# Patient Record
Sex: MALE | Race: WHITE
[De-identification: names, ages, dates, MRNs, and addresses within clinical notes are randomized per-mention and may not be internally consistent; named-entity substitution may affect disease eponyms.]

---

## 2021-04-09 ENCOUNTER — HOSPITAL ENCOUNTER (OUTPATIENT)
Dept: HOSPITAL 79 - KOH-I | Age: 61
End: 2021-04-09
Attending: CLINIC/CENTER
Payer: MEDICARE

## 2021-04-09 DIAGNOSIS — M19.071: ICD-10-CM

## 2021-04-09 DIAGNOSIS — M25.571: Primary | ICD-10-CM

## 2021-04-15 ENCOUNTER — APPOINTMENT (OUTPATIENT)
Dept: VACCINE CLINIC | Facility: HOSPITAL | Age: 61
End: 2021-04-15

## 2021-05-06 ENCOUNTER — IMMUNIZATION (OUTPATIENT)
Dept: VACCINE CLINIC | Facility: HOSPITAL | Age: 61
End: 2021-05-06

## 2021-05-06 PROCEDURE — 0001A: CPT | Performed by: INTERNAL MEDICINE

## 2021-05-06 PROCEDURE — 91300 HC SARSCOV02 VAC 30MCG/0.3ML IM: CPT | Performed by: INTERNAL MEDICINE

## 2021-05-28 ENCOUNTER — IMMUNIZATION (OUTPATIENT)
Dept: VACCINE CLINIC | Facility: HOSPITAL | Age: 61
End: 2021-05-28

## 2021-05-28 PROCEDURE — 0002A: CPT | Performed by: INTERNAL MEDICINE

## 2021-05-28 PROCEDURE — 91300 HC SARSCOV02 VAC 30MCG/0.3ML IM: CPT | Performed by: INTERNAL MEDICINE

## 2022-01-05 ENCOUNTER — HOSPITAL ENCOUNTER (OUTPATIENT)
Dept: HOSPITAL 79 - KOH-I | Age: 62
End: 2022-01-05
Attending: NURSE PRACTITIONER
Payer: MEDICARE

## 2022-01-05 DIAGNOSIS — M62.830: ICD-10-CM

## 2022-01-05 DIAGNOSIS — R07.82: Primary | ICD-10-CM

## 2022-11-23 ENCOUNTER — OFFICE VISIT (OUTPATIENT)
Dept: UROLOGY | Facility: CLINIC | Age: 62
End: 2022-11-23

## 2022-11-23 VITALS — WEIGHT: 230 LBS | HEIGHT: 69 IN | BODY MASS INDEX: 34.07 KG/M2

## 2022-11-23 DIAGNOSIS — R35.0 FREQUENCY OF MICTURITION: ICD-10-CM

## 2022-11-23 DIAGNOSIS — N41.0 ACUTE PROSTATITIS: Primary | ICD-10-CM

## 2022-11-23 LAB
BILIRUB BLD-MCNC: NEGATIVE MG/DL
CLARITY, POC: CLEAR
COLOR UR: ABNORMAL
EXPIRATION DATE: ABNORMAL
GLUCOSE UR STRIP-MCNC: NEGATIVE MG/DL
KETONES UR QL: NEGATIVE
LEUKOCYTE EST, POC: NEGATIVE
Lab: ABNORMAL
NITRITE UR-MCNC: NEGATIVE MG/ML
PH UR: 6 [PH] (ref 5–8)
PROT UR STRIP-MCNC: ABNORMAL MG/DL
RBC # UR STRIP: NEGATIVE /UL
SP GR UR: 1.02 (ref 1–1.03)
UROBILINOGEN UR QL: NORMAL

## 2022-11-23 PROCEDURE — 99203 OFFICE O/P NEW LOW 30 MIN: CPT

## 2022-11-23 PROCEDURE — 81003 URINALYSIS AUTO W/O SCOPE: CPT

## 2022-11-23 RX ORDER — SULFAMETHOXAZOLE AND TRIMETHOPRIM 800; 160 MG/1; MG/1
1 TABLET ORAL 2 TIMES DAILY
Qty: 28 TABLET | Refills: 0 | Status: SHIPPED | OUTPATIENT
Start: 2022-11-23 | End: 2022-12-09 | Stop reason: SDUPTHER

## 2022-11-23 NOTE — PROGRESS NOTES
"Chief Complaint:    Chief Complaint   Patient presents with   • Difficulty Urinating     Lower abdominal pain, hematuria. New pt       Vital Signs:   Ht 175.3 cm (69\")   Wt 104 kg (230 lb)   BMI 33.97 kg/m²   Body mass index is 33.97 kg/m².      HPI:  Bahman Hernandez is a 62 y.o. male who presents today for initial evaluation     History of Present Illness  Mr. Hernandez presents to the clinic for initial evaluation of hematuria and lower abdominal pain.  Patient reports that he was diagnosed with acute prostatitis around the end of October.  He was started on a 10-day course of ciprofloxacin 500 mg once daily.  Reports his symptoms have improved after finishing medication.  States that he still experiences some hematuria, dysuria perineum pain, and blood in semen.  Denies any fevers, chills, difficulty urinating, frequency, urgency, or scrotal pain.  PSA taken on October 31, 2022 came back slightly elevated at 4.1.  I suspect this is secondary to prostatitis.  I would like to continue the patient on a 2-week course of Bactrim at this time and have him follow-up with me for reevaluation of symptoms.      Past Medical History:  History reviewed. No pertinent past medical history.    Current Meds:  Current Outpatient Medications   Medication Sig Dispense Refill   • sulfamethoxazole-trimethoprim (Bactrim DS) 800-160 MG per tablet Take 1 tablet by mouth 2 (Two) Times a Day. 28 tablet 0     No current facility-administered medications for this visit.        Allergies:   Allergies   Allergen Reactions   • Hydrocortisone Anaphylaxis   • Lisinopril Other (See Comments)     Severe joint pain        Past Surgical History:  History reviewed. No pertinent surgical history.    Social History:  Social History     Socioeconomic History   • Marital status: Single       Family History:  History reviewed. No pertinent family history.    Review of Systems:  Review of Systems   Constitutional: Negative for fatigue, fever and unexpected " weight change.   Respiratory: Negative for chest tightness and shortness of breath.    Cardiovascular: Negative for chest pain.   Gastrointestinal: Negative for abdominal pain, constipation, diarrhea, nausea and vomiting.   Genitourinary: Positive for dysuria, flank pain and hematuria. Negative for difficulty urinating, frequency and urgency.   Skin: Negative for rash.   Psychiatric/Behavioral: Negative for confusion and suicidal ideas.       Physical Exam:  Physical Exam  Constitutional:       General: He is not in acute distress.     Appearance: Normal appearance.   HENT:      Head: Normocephalic and atraumatic.      Nose: Nose normal.      Mouth/Throat:      Mouth: Mucous membranes are moist.   Eyes:      Conjunctiva/sclera: Conjunctivae normal.   Cardiovascular:      Rate and Rhythm: Normal rate and regular rhythm.      Pulses: Normal pulses.      Heart sounds: Normal heart sounds.   Pulmonary:      Effort: Pulmonary effort is normal.      Breath sounds: Normal breath sounds.   Abdominal:      General: Bowel sounds are normal.      Palpations: Abdomen is soft.   Genitourinary:     Comments: He did not want a prostate exam at this time  Musculoskeletal:         General: Normal range of motion.      Cervical back: Normal range of motion.   Skin:     General: Skin is warm.   Neurological:      General: No focal deficit present.      Mental Status: He is alert and oriented to person, place, and time.   Psychiatric:         Mood and Affect: Mood normal.         Behavior: Behavior normal.         Thought Content: Thought content normal.         Judgment: Judgment normal.         Recent Image (CT and/or KUB):   CT Abdomen and Pelvis: No results found for this or any previous visit.     CT Stone Protocol: No results found for this or any previous visit.     KUB: No results found for this or any previous visit.       Labs:  Brief Urine Lab Results  (Last result in the past 365 days)      Color   Clarity   Blood   Leuk  Est   Nitrite   Protein   CREAT   Urine HCG        11/23/22 1125 Dark Yellow   Clear   Negative   Negative   Negative   Trace               Office Visit on 11/23/2022   Component Date Value Ref Range Status   • Color 11/23/2022 Dark Yellow  Yellow, Straw, Dark Yellow, Jenny Final   • Clarity, UA 11/23/2022 Clear  Clear Final   • Specific Gravity  11/23/2022 1.025  1.005 - 1.030 Final   • pH, Urine 11/23/2022 6.0  5.0 - 8.0 Final   • Leukocytes 11/23/2022 Negative  Negative Final   • Nitrite, UA 11/23/2022 Negative  Negative Final   • Protein, POC 11/23/2022 Trace (A)  Negative mg/dL Final   • Glucose, UA 11/23/2022 Negative  Negative mg/dL Final   • Ketones, UA 11/23/2022 Negative  Negative Final   • Urobilinogen, UA 11/23/2022 Normal  Normal, 0.2 E.U./dL Final   • Bilirubin 11/23/2022 Negative  Negative Final   • Blood, UA 11/23/2022 Negative  Negative Final   • Lot Number 11/23/2022 [id number]   Final   • Expiration Date 11/23/2022 2/8/24   Final        Procedure: None  Procedures     I have reviewed and agree with the above PMH, PSH, FMH, social history, medications, allergies, and labs.     Assessment/Plan:   Problem List Items Addressed This Visit        Genitourinary and Reproductive     Acute prostatitis - Primary    Relevant Medications    sulfamethoxazole-trimethoprim (Bactrim DS) 800-160 MG per tablet   Other Visit Diagnoses     Frequency of micturition              Health Maintenance:   Health Maintenance Due   Topic Date Due   • COLORECTAL CANCER SCREENING  Never done   • TDAP/TD VACCINES (1 - Tdap) Never done   • ZOSTER VACCINE (1 of 2) Never done   • COVID-19 Vaccine (3 - Booster for Pfizer series) 07/23/2021   • INFLUENZA VACCINE  Never done   • HEPATITIS C SCREENING  Never done   • ANNUAL WELLNESS VISIT  Never done        Smoking Counseling: Patient has never smoked or use smokeless tobacco    Urine Incontinence: Patient reports that he is not currently experiencing any symptoms of urinary  incontinence.    Patient was given instructions and counseling regarding his condition or for health maintenance advice. Please see specific information pulled into the AVS if appropriate.    Patient Education:   Acute prostatitis -discussed with the patient the pathophysiology of acute prostatitis.  Advised patient that it can take roughly 6 to 8 weeks for full treatment of prostatitis.  At this time I am recommending a 14-day course of Bactrim twice daily.  Patient has previously completed a 10-day course of ciprofloxacin 500 mg twice daily.  Advised patient to increase water intake to 2 to 3 L/day.  Advised patient to use warm soaks/compresses twice daily for 10 to 15 minutes.  Advised patient alternate ibuprofen and Tylenol every 4-6 hours as needed for pain/inflammation.  I will repeat the patient's PSA at his next visit.  He did not want a digital rectal exam at this time.  Advised patient return to clinic or the nearest ER if he experiences fever, chills, nausea, vomiting, gross blood but does not stop, or urinary retention.  Otherwise I will follow-up with the patient in 2 weeks for reevaluation.  Patient verbalizes understanding and agrees to plan of care.    Visit Diagnoses:    ICD-10-CM ICD-9-CM   1. Acute prostatitis  N41.0 601.0   2. Frequency of micturition  R35.0 788.41       Meds Ordered During Visit:  New Medications Ordered This Visit   Medications   • sulfamethoxazole-trimethoprim (Bactrim DS) 800-160 MG per tablet     Sig: Take 1 tablet by mouth 2 (Two) Times a Day.     Dispense:  28 tablet     Refill:  0       Follow Up Appointment: 2 weeks  No follow-ups on file.      This document has been electronically signed by Papo Garibay PA-C   November 23, 2022 11:55 EST    Part of this note may be an electronic transcription/translation of spoken language to printed text using the Dragon Dictation System.

## 2022-12-09 ENCOUNTER — OFFICE VISIT (OUTPATIENT)
Dept: UROLOGY | Facility: CLINIC | Age: 62
End: 2022-12-09

## 2022-12-09 VITALS — HEIGHT: 69 IN | WEIGHT: 230 LBS | BODY MASS INDEX: 34.07 KG/M2

## 2022-12-09 DIAGNOSIS — N41.0 ACUTE PROSTATITIS: ICD-10-CM

## 2022-12-09 PROCEDURE — 99213 OFFICE O/P EST LOW 20 MIN: CPT

## 2022-12-09 RX ORDER — SULFAMETHOXAZOLE AND TRIMETHOPRIM 800; 160 MG/1; MG/1
1 TABLET ORAL 2 TIMES DAILY
Qty: 28 TABLET | Refills: 0 | Status: SHIPPED | OUTPATIENT
Start: 2022-12-09 | End: 2023-03-27

## 2022-12-09 NOTE — PROGRESS NOTES
"Chief Complaint:    Chief Complaint   Patient presents with   • Acute prostatitis     2 wk follow up       Vital Signs:   Ht 175.3 cm (69.02\")   Wt 104 kg (230 lb)   BMI 33.95 kg/m²   Body mass index is 33.95 kg/m².      HPI:  Bahman Hernandez is a 62 y.o. male who presents today for follow up    History of Present Illness  Mr. Hernandez presents to the clinic for 2 week follow up for acute prostatitis. Patient had previously been on ciprofloxacin 500mg for 10 days by his PCP. Symptoms shortly returned after finishing the antibiotics and I started him on a 14 day course of bactrim twice daily. He reports that since taking the antibiotics he has felt remarkable better. He denies any more pain in the perineum or rectum. He reports some blood in his semen 2-3 days ago. Denies any dysuria, fever, chills, nausea, vomiting, difficulty urinating, frequency, or urgency. I will have the patient take another 14 day course of bactrim to make sure the infection is treated properly. I will have him follow up in 3 months for a repeat PSA.      Past Medical History:  History reviewed. No pertinent past medical history.    Current Meds:  Current Outpatient Medications   Medication Sig Dispense Refill   • sulfamethoxazole-trimethoprim (Bactrim DS) 800-160 MG per tablet Take 1 tablet by mouth 2 (Two) Times a Day. 28 tablet 0     No current facility-administered medications for this visit.        Allergies:   Allergies   Allergen Reactions   • Hydrocortisone Anaphylaxis   • Lisinopril Other (See Comments)     Severe joint pain        Past Surgical History:  History reviewed. No pertinent surgical history.    Social History:  Social History     Socioeconomic History   • Marital status: Single   Tobacco Use   • Smoking status: Never   • Smokeless tobacco: Never       Family History:  History reviewed. No pertinent family history.    Review of Systems:  Review of Systems   Constitutional: Negative for fatigue, fever and unexpected weight " change.   Respiratory: Negative for chest tightness and shortness of breath.    Cardiovascular: Negative for chest pain.   Gastrointestinal: Negative for abdominal pain, constipation, diarrhea, nausea and vomiting.   Genitourinary: Negative for difficulty urinating, dysuria, flank pain, frequency, hematuria, penile discharge, penile pain, penile swelling, scrotal swelling, testicular pain and urgency.   Skin: Negative for rash.   Psychiatric/Behavioral: Negative for confusion and suicidal ideas.       Physical Exam:  Physical Exam  Constitutional:       General: He is not in acute distress.     Appearance: Normal appearance.   HENT:      Head: Normocephalic and atraumatic.      Nose: Nose normal.      Mouth/Throat:      Mouth: Mucous membranes are moist.   Eyes:      Conjunctiva/sclera: Conjunctivae normal.   Cardiovascular:      Rate and Rhythm: Normal rate and regular rhythm.      Pulses: Normal pulses.      Heart sounds: Normal heart sounds.   Pulmonary:      Effort: Pulmonary effort is normal.      Breath sounds: Normal breath sounds.   Abdominal:      General: Bowel sounds are normal.      Palpations: Abdomen is soft.      Tenderness: There is no right CVA tenderness or left CVA tenderness.   Genitourinary:     Comments: He did not want a prostate exam  Musculoskeletal:         General: Normal range of motion.      Cervical back: Normal range of motion.   Skin:     General: Skin is warm.   Neurological:      General: No focal deficit present.      Mental Status: He is alert and oriented to person, place, and time.   Psychiatric:         Mood and Affect: Mood normal.         Behavior: Behavior normal.         Thought Content: Thought content normal.         Judgment: Judgment normal.         Recent Image (CT and/or KUB):   CT Abdomen and Pelvis: No results found for this or any previous visit.     CT Stone Protocol: No results found for this or any previous visit.     KUB: No results found for this or any  previous visit.       Labs:  Brief Urine Lab Results  (Last result in the past 365 days)      Color   Clarity   Blood   Leuk Est   Nitrite   Protein   CREAT   Urine HCG        11/23/22 1125 Dark Yellow   Clear   Negative   Negative   Negative   Trace               Office Visit on 11/23/2022   Component Date Value Ref Range Status   • Color 11/23/2022 Dark Yellow  Yellow, Straw, Dark Yellow, Jenny Final   • Clarity, UA 11/23/2022 Clear  Clear Final   • Specific Gravity  11/23/2022 1.025  1.005 - 1.030 Final   • pH, Urine 11/23/2022 6.0  5.0 - 8.0 Final   • Leukocytes 11/23/2022 Negative  Negative Final   • Nitrite, UA 11/23/2022 Negative  Negative Final   • Protein, POC 11/23/2022 Trace (A)  Negative mg/dL Final   • Glucose, UA 11/23/2022 Negative  Negative mg/dL Final   • Ketones, UA 11/23/2022 Negative  Negative Final   • Urobilinogen, UA 11/23/2022 Normal  Normal, 0.2 E.U./dL Final   • Bilirubin 11/23/2022 Negative  Negative Final   • Blood, UA 11/23/2022 Negative  Negative Final   • Lot Number 11/23/2022 [id number]   Final   • Expiration Date 11/23/2022 2/8/24   Final        Procedure: None  Procedures     I have reviewed and agree with the above PMH, PSH, FMH, social history, medications, allergies, and labs.     Assessment/Plan:   Problem List Items Addressed This Visit        Genitourinary and Reproductive     Acute prostatitis    Relevant Medications    sulfamethoxazole-trimethoprim (Bactrim DS) 800-160 MG per tablet       Health Maintenance:   Health Maintenance Due   Topic Date Due   • COLORECTAL CANCER SCREENING  Never done   • TDAP/TD VACCINES (1 - Tdap) Never done   • ZOSTER VACCINE (1 of 2) Never done   • COVID-19 Vaccine (3 - Booster for Pfizer series) 07/23/2021   • INFLUENZA VACCINE  Never done   • HEPATITIS C SCREENING  Never done   • ANNUAL WELLNESS VISIT  Never done        Smoking Counseling: Never smoked or used smokeless tobacco.    Urine Incontinence: Patient reports that he is not  currently experiencing any symptoms of urinary incontinence.    Patient was given instructions and counseling regarding his condition or for health maintenance advice. Please see specific information pulled into the AVS if appropriate.    Patient Education:   Acute prostatitis - I discussed the pathophysiology of acute prostatitis. I discussed the use of antibiotics for 4-6 weeks to fully treat infection. I educated patient of unwanted sequale if infection is not properly treated. Advised patient to increase water intake to 2-3L per day. I will restart the patient on a 2 week course of bactrim to insure that infection is treated properly. I advised patient to return to clinic sooner if symptoms persist after finishing the antibiotic. Otherwise, I will follow up with him in three months for a repeat PSA and revaluation. Patient verbalizes understanding and agrees to plan of care.    Visit Diagnoses:    ICD-10-CM ICD-9-CM   1. Acute prostatitis  N41.0 601.0       Meds Ordered During Visit:  New Medications Ordered This Visit   Medications   • sulfamethoxazole-trimethoprim (Bactrim DS) 800-160 MG per tablet     Sig: Take 1 tablet by mouth 2 (Two) Times a Day.     Dispense:  28 tablet     Refill:  0       Follow Up Appointment: 3 months  No follow-ups on file.      This document has been electronically signed by Papo Garibay PA-C   December 11, 2022 19:22 EST    Part of this note may be an electronic transcription/translation of spoken language to printed text using the Dragon Dictation System.

## 2023-03-27 ENCOUNTER — OFFICE VISIT (OUTPATIENT)
Dept: UROLOGY | Facility: CLINIC | Age: 63
End: 2023-03-27
Payer: MEDICARE

## 2023-03-27 VITALS
OXYGEN SATURATION: 98 % | WEIGHT: 230 LBS | HEIGHT: 69 IN | SYSTOLIC BLOOD PRESSURE: 175 MMHG | DIASTOLIC BLOOD PRESSURE: 89 MMHG | BODY MASS INDEX: 34.07 KG/M2 | HEART RATE: 66 BPM

## 2023-03-27 DIAGNOSIS — R97.20 ELEVATED PSA: ICD-10-CM

## 2023-03-27 DIAGNOSIS — N41.0 ACUTE PROSTATITIS: Primary | ICD-10-CM

## 2023-03-27 PROCEDURE — 99213 OFFICE O/P EST LOW 20 MIN: CPT

## 2023-03-27 PROCEDURE — 1159F MED LIST DOCD IN RCRD: CPT

## 2023-03-27 PROCEDURE — 1160F RVW MEDS BY RX/DR IN RCRD: CPT

## 2023-03-27 RX ORDER — METOPROLOL TARTRATE 50 MG/1
50 TABLET, FILM COATED ORAL DAILY
COMMUNITY
Start: 2023-01-03

## 2023-03-27 RX ORDER — SULFAMETHOXAZOLE AND TRIMETHOPRIM 800; 160 MG/1; MG/1
1 TABLET ORAL 2 TIMES DAILY
Qty: 84 TABLET | Refills: 1 | Status: SHIPPED | OUTPATIENT
Start: 2023-03-27

## 2023-03-27 RX ORDER — PRIMIDONE 50 MG/1
TABLET ORAL
COMMUNITY
Start: 2023-01-25

## 2023-03-27 NOTE — PROGRESS NOTES
"Chief Complaint:    Chief Complaint   Patient presents with   • Acute prostatitis     3 month follow up       Vital Signs:   /89 (BP Location: Left arm, Patient Position: Sitting)   Pulse 66   Ht 175.3 cm (69.02\")   Wt 104 kg (230 lb)   SpO2 98%   BMI 33.95 kg/m²   Body mass index is 33.95 kg/m².      HPI:  Bahman Hernandez is a 63 y.o. male who presents today for follow up    History of Present Illness  Mr. Hernandez presents to the clinic for follow-up for acute prostatitis.  I initially seen the patient was given a 2-week course of Bactrim with significant improvement in symptoms.  He returned to the clinic after finish 2-week course and still had persistent hematospermia and I placed him on another 2-week course.  He states that after finishing those antibiotics he did not have any other symptoms.  States that roughly 1 week ago he started to notice blood in his sperm.  He denies any fever, chills, dysuria, difficulty urinating, penile pain, perineum pain, back pain, hematochezia, or frequency/urgency.  UA today is completely normal.  This time I am recommending a repeat PSA free and total given previous elevated PSA of 4.1.  I will call patient with results.  Per patient's request he would like to have a cystoscopy completed in office.  I will schedule him appropriately.      Past Medical History:  History reviewed. No pertinent past medical history.    Current Meds:  Current Outpatient Medications   Medication Sig Dispense Refill   • metoprolol tartrate (LOPRESSOR) 50 MG tablet Take 1 tablet by mouth Daily. for blood pressure.     • primidone (MYSOLINE) 50 MG tablet TAKE 1/2 TABLET BY MOUTH ONCE DAILY IN THE EVENING AS DIRECTED BY YOUR PRESCRIBER     • sulfamethoxazole-trimethoprim (Bactrim DS) 800-160 MG per tablet Take 1 tablet by mouth 2 (Two) Times a Day. 84 tablet 1     No current facility-administered medications for this visit.        Allergies:   Allergies   Allergen Reactions   • Hydrocortisone " Anaphylaxis   • Lisinopril Other (See Comments)     Severe joint pain        Past Surgical History:  History reviewed. No pertinent surgical history.    Social History:  Social History     Socioeconomic History   • Marital status: Single   Tobacco Use   • Smoking status: Never   • Smokeless tobacco: Never       Family History:  History reviewed. No pertinent family history.    Review of Systems:  Review of Systems   Constitutional: Negative for fatigue, fever and unexpected weight change.   Respiratory: Negative for chest tightness and shortness of breath.    Cardiovascular: Negative for chest pain.   Gastrointestinal: Negative for abdominal pain, constipation, diarrhea, nausea and vomiting.   Genitourinary: Negative for difficulty urinating, dysuria, flank pain, frequency, hematuria, penile discharge, penile pain, penile swelling, scrotal swelling, testicular pain and urgency.   Skin: Negative for rash.   Psychiatric/Behavioral: Negative for confusion and suicidal ideas.       Physical Exam:  Physical Exam  Constitutional:       General: He is not in acute distress.     Appearance: Normal appearance.   HENT:      Head: Normocephalic and atraumatic.      Nose: Nose normal.      Mouth/Throat:      Mouth: Mucous membranes are moist.   Eyes:      Conjunctiva/sclera: Conjunctivae normal.   Cardiovascular:      Rate and Rhythm: Normal rate and regular rhythm.      Pulses: Normal pulses.      Heart sounds: Normal heart sounds.   Pulmonary:      Effort: Pulmonary effort is normal.      Breath sounds: Normal breath sounds.   Abdominal:      General: Bowel sounds are normal.      Palpations: Abdomen is soft.      Tenderness: There is no right CVA tenderness or left CVA tenderness.   Musculoskeletal:         General: Normal range of motion.      Cervical back: Normal range of motion.   Skin:     General: Skin is warm.   Neurological:      General: No focal deficit present.      Mental Status: He is alert and oriented to  person, place, and time.   Psychiatric:         Mood and Affect: Mood normal.         Behavior: Behavior normal.         Thought Content: Thought content normal.         Judgment: Judgment normal.       Recent Image (CT and/or KUB):   CT Abdomen and Pelvis: No results found for this or any previous visit.     CT Stone Protocol: No results found for this or any previous visit.     KUB: No results found for this or any previous visit.       Labs:  Brief Urine Lab Results  (Last result in the past 365 days)      Color   Clarity   Blood   Leuk Est   Nitrite   Protein   CREAT   Urine HCG        11/23/22 1125 Dark Yellow   Clear   Negative   Negative   Negative   Trace               No visits with results within 3 Month(s) from this visit.   Latest known visit with results is:   Office Visit on 11/23/2022   Component Date Value Ref Range Status   • Color 11/23/2022 Dark Yellow  Yellow, Straw, Dark Yellow, Jenny Final   • Clarity, UA 11/23/2022 Clear  Clear Final   • Specific Gravity  11/23/2022 1.025  1.005 - 1.030 Final   • pH, Urine 11/23/2022 6.0  5.0 - 8.0 Final   • Leukocytes 11/23/2022 Negative  Negative Final   • Nitrite, UA 11/23/2022 Negative  Negative Final   • Protein, POC 11/23/2022 Trace (A)  Negative mg/dL Final   • Glucose, UA 11/23/2022 Negative  Negative mg/dL Final   • Ketones, UA 11/23/2022 Negative  Negative Final   • Urobilinogen, UA 11/23/2022 Normal  Normal, 0.2 E.U./dL Final   • Bilirubin 11/23/2022 Negative  Negative Final   • Blood, UA 11/23/2022 Negative  Negative Final   • Lot Number 11/23/2022 [id number]   Final   • Expiration Date 11/23/2022 2/8/24   Final        Procedure: None  Procedures     I have reviewed and agree with the above PMH, PSH, FMH, social history, medications, allergies, and labs.     Assessment/Plan:   Problem List Items Addressed This Visit        Genitourinary and Reproductive     Acute prostatitis - Primary    Relevant Medications    sulfamethoxazole-trimethoprim  (Bactrim DS) 800-160 MG per tablet   Other Visit Diagnoses     Benign prostatic hyperplasia with urinary frequency        Relevant Medications    sulfamethoxazole-trimethoprim (Bactrim DS) 800-160 MG per tablet    Other Relevant Orders    PSA Total+% Free (Serial)          Health Maintenance:   Health Maintenance Due   Topic Date Due   • COLORECTAL CANCER SCREENING  Never done   • TDAP/TD VACCINES (1 - Tdap) Never done   • ZOSTER VACCINE (1 of 2) Never done   • COVID-19 Vaccine (3 - Booster for Pfizer series) 07/23/2021   • INFLUENZA VACCINE  Never done   • HEPATITIS C SCREENING  Never done   • ANNUAL WELLNESS VISIT  Never done        Smoking Counseling: Never smoked or use smokeless tobacco    Urine Incontinence: Patient reports that he is not currently experiencing any symptoms of urinary incontinence.    Patient was given instructions and counseling regarding his condition or for health maintenance advice. Please see specific information pulled into the AVS if appropriate.    Patient Education:   Acute prostatitis -discussed with the patient the pathophysiology of prostatitis.  Discussed with him acute versus chronic.  I discussed the signs and symptoms of prostatitis including appropriate treatment with conservative versus antibiotic therapy.  Advised patient to use warm soaks and compresses twice daily for 10 to 15 minutes.  This time recommending another trial of Bactrim for at least 2 weeks.  I will send enough antibiotics in case symptoms return.  Advised patient that this will need to be treated appropriately or otherwise he will have risk for chronic prostatitis and unwanted sequelae.  Benign Prostate Hypertrophy (BPH): Discussed the pathophysiology of BPH and obstruction.  We discussed the static and dynamic effects of BPH as well as using 5 alpha reductase inhibitors versus alpha blockade.  We discussed the indications for transurethral surgery as well and/ or other therapeutic options available  including all of the newer techniques.  Denies any significant urinary symptoms at this time  PSA testing -given patient's history of elevated PSA I am recommending a repeat PSA free and total. I discussed the pathophysiology of PSA testing indicating its use in the diagnosis and management of prostate cancer.  I discussed the normal range being 0 to 4, but more appropriately being much closer to 0 to 2 in a normal male.  I discussed the fact that after a certain age it is against recommendation to use PSA testing especially in view of numerous comorbidities.  I discussed many of the things that can artificially raise PSA including put not limited to a recent infection, urinary tract infection, recent sexual intercourse, or even the type of movement such as manipulation of the prostate from riding a bicycle.  It was discussed that the most important use of PSA is the velocity measurement.  This refers to the change of PSA with time. I discussed that we look for greater than 20% rise over a year to help us make the prediction of prostate cancer.  I also discussed that in the case of prostate cancer indicating a radical prostatectomy, the PSA should be 0 and any rise indicates an early biochemical recurrence.  I will call patient with PSA results.  Per patient's request he would like to have a cystoscopy completed in office.  I will schedule him as soon as possible.  Patient verbalized understanding agrees to plan of care.    Visit Diagnoses:    ICD-10-CM ICD-9-CM   1. Acute prostatitis  N41.0 601.0   2. Benign prostatic hyperplasia with urinary frequency  N40.1 600.01    R35.0 788.41       Meds Ordered During Visit:  New Medications Ordered This Visit   Medications   • sulfamethoxazole-trimethoprim (Bactrim DS) 800-160 MG per tablet     Sig: Take 1 tablet by mouth 2 (Two) Times a Day.     Dispense:  84 tablet     Refill:  1       Follow Up Appointment: Cystoscopy  No follow-ups on file.      This document has been  electronically signed by Papo Garibay PA-C   March 28, 2023 07:56 EDT    Part of this note may be an electronic transcription/translation of spoken language to printed text using the Dragon Dictation System.

## 2023-05-02 ENCOUNTER — PROCEDURE VISIT (OUTPATIENT)
Dept: UROLOGY | Facility: CLINIC | Age: 63
End: 2023-05-02
Payer: MEDICARE

## 2023-05-02 VITALS
HEIGHT: 69 IN | HEART RATE: 108 BPM | DIASTOLIC BLOOD PRESSURE: 108 MMHG | WEIGHT: 230 LBS | BODY MASS INDEX: 34.07 KG/M2 | SYSTOLIC BLOOD PRESSURE: 181 MMHG

## 2023-05-02 DIAGNOSIS — Z48.816 AFTERCARE FOLLOWING SURGERY OF THE GENITOURINARY SYSTEM: Primary | ICD-10-CM

## 2023-05-02 DIAGNOSIS — N41.0 ACUTE PROSTATITIS: ICD-10-CM

## 2023-05-02 RX ORDER — GENTAMICIN SULFATE 40 MG/ML
80 INJECTION, SOLUTION INTRAMUSCULAR; INTRAVENOUS ONCE
Status: COMPLETED | OUTPATIENT
Start: 2023-05-02 | End: 2023-05-02

## 2023-05-02 RX ADMIN — GENTAMICIN SULFATE 80 MG: 40 INJECTION, SOLUTION INTRAMUSCULAR; INTRAVENOUS at 09:36

## 2023-05-02 NOTE — PROGRESS NOTES
Chief Complaint:    Prostatitis    HPI:   63 y.o. male I initially seen the patient was given a 2-week course of Bactrim with significant improvement in symptoms.  He returned to the clinic after finish 2-week course and still had persistent hematospermia and I placed him on another 2-week course.  He states that after finishing those antibiotics he did not have any other symptoms.  States that roughly 1 week ago he started to notice blood in his sperm.  He denies any fever, chills, dysuria, difficulty urinating, penile pain, perineum pain, back pain, hematochezia, or frequency/urgency.  UA today is completely normal.  This time I am recommending a repeat PSA free and total given previous elevated PSA of 4.1.  I will call patient with results.  Per patient's request he would like to have a cystoscopy completed in office.  I will schedule him appropriately.  He is here for cystoscopy today.    Past Medical History:     No past medical history on file.    Current Meds:     Current Outpatient Medications   Medication Sig Dispense Refill   • metoprolol tartrate (LOPRESSOR) 50 MG tablet Take 1 tablet by mouth Daily. for blood pressure.     • primidone (MYSOLINE) 50 MG tablet TAKE 1/2 TABLET BY MOUTH ONCE DAILY IN THE EVENING AS DIRECTED BY YOUR PRESCRIBER     • sulfamethoxazole-trimethoprim (Bactrim DS) 800-160 MG per tablet Take 1 tablet by mouth 2 (Two) Times a Day. 84 tablet 1     No current facility-administered medications for this visit.        Allergies:      Allergies   Allergen Reactions   • Hydrocortisone Anaphylaxis   • Lisinopril Other (See Comments)     Severe joint pain        Past Surgical History:     No past surgical history on file.    Social History:     Social History     Socioeconomic History   • Marital status: Single   Tobacco Use   • Smoking status: Never   • Smokeless tobacco: Never       Family History:     No family history on file.    Review of Systems:     Review of Systems   Constitutional:  Negative.    HENT: Negative.    Eyes: Negative.    Respiratory: Negative.    Cardiovascular: Negative.    Gastrointestinal: Negative.    Endocrine: Negative.    Genitourinary: Positive for frequency.   Musculoskeletal: Negative.    Allergic/Immunologic: Negative.    Neurological: Negative.    Hematological: Negative.    Psychiatric/Behavioral: Negative.        Physical Exam:     Physical Exam  Vitals and nursing note reviewed.   Constitutional:       Appearance: He is well-developed.   HENT:      Head: Normocephalic and atraumatic.   Eyes:      Conjunctiva/sclera: Conjunctivae normal.      Pupils: Pupils are equal, round, and reactive to light.   Cardiovascular:      Rate and Rhythm: Normal rate and regular rhythm.      Heart sounds: Normal heart sounds.   Pulmonary:      Effort: Pulmonary effort is normal.      Breath sounds: Normal breath sounds.   Abdominal:      General: Bowel sounds are normal.      Palpations: Abdomen is soft.   Genitourinary:     Penis: Normal.       Testes: Normal.   Musculoskeletal:         General: Normal range of motion.      Cervical back: Normal range of motion.   Skin:     General: Skin is warm and dry.   Neurological:      Mental Status: He is alert and oriented to person, place, and time.      Deep Tendon Reflexes: Reflexes are normal and symmetric.   Psychiatric:         Behavior: Behavior normal.         Thought Content: Thought content normal.         Judgment: Judgment normal.         I have reviewed the following portions of the patient's history: Allergies, current medications, past family history, past medical history, past social history, past surgical history, problem list, and ROS and confirm it is accurate.    Recent Image (CT and/or KUB):      CT Abdomen and Pelvis: No results found for this or any previous visit.       CT Stone Protocol: No results found for this or any previous visit.       KUB: No results found for this or any previous visit.       Labs (past 3  months):      No visits with results within 3 Month(s) from this visit.   Latest known visit with results is:   Office Visit on 11/23/2022   Component Date Value Ref Range Status   • Color 11/23/2022 Dark Yellow  Yellow, Straw, Dark Yellow, Jenny Final   • Clarity, UA 11/23/2022 Clear  Clear Final   • Specific Gravity  11/23/2022 1.025  1.005 - 1.030 Final   • pH, Urine 11/23/2022 6.0  5.0 - 8.0 Final   • Leukocytes 11/23/2022 Negative  Negative Final   • Nitrite, UA 11/23/2022 Negative  Negative Final   • Protein, POC 11/23/2022 Trace (A)  Negative mg/dL Final   • Glucose, UA 11/23/2022 Negative  Negative mg/dL Final   • Ketones, UA 11/23/2022 Negative  Negative Final   • Urobilinogen, UA 11/23/2022 Normal  Normal, 0.2 E.U./dL Final   • Bilirubin 11/23/2022 Negative  Negative Final   • Blood, UA 11/23/2022 Negative  Negative Final   • Lot Number 11/23/2022 [id number]   Final   • Expiration Date 11/23/2022 2/8/24   Final        Procedure:   Cystoscopy:  Patient presents today for cystourethroscopy.  I went ahead and obtained an informed consent including the risks of anesthesia, bleeding, infection, etc.  After prepping and draping in a sterile fashion in the low dorsal lithotomy position, the urethra was gently anesthetized with 10 mL of 2% viscous Xylocaine jelly.  After an appropriate period of topical anesthesia, I used the Olympus digital 14 Romansh flexible cystoscope to examine the anterior urethra which was completely normal.  The ureteral orifices were visualized and normal in position and configuration. There were no stones, tumors, or foreign bodies.  There was obvious prostatitis noted in the prostatic fossa.  The patient was given 80 mg of gentamicin in an intramuscular fashion as prophylaxis for the cystoscopy and released from the clinic.    Assessment/Plan:   Prostatitis-we discussed the differential diagnosis of prostatitis including the National Institutes of Health classification system I  through IV.  I discussed that type I prostatitis is acute bacterial prostatitis and associated with high fevers, typically hematuria, and severe pain with voiding.  We discussed the fact that it necessitates 6 weeks of chronic antibiotics to be sure it doesn't turn into a chronic bacterial prostatitis that can have lifelong ramifications.  We discussed National Institutes of Health type II chronic bacterial prostatitis.  We discussed the fact that this a chronic bacterial infection of the prostate that often requires suppressive antibiotics.  We discussed type III being related more to nonspecific urethritis, as well as tight for being related to finding inflammation and a biopsy in the absence of symptomatology.  I discussed the diagnostic strategies including the VB 1 through 4 urine culture and discussed empiric therapy.  I emphasized that with the use of chronic antibiotics, I strongly recommended the concomitant use of probiotics.  Additionally, we discussed the various antibiotics used and the risks and benefits associated with each, particularly the use of long-term ciprofloxacin and the effect on joints and collagen in human beings.          This document has been electronically signed by TRAVIS BERRY MD May 2, 2023 09:11 EDT    Dictated Utilizing Dragon Dictation: Part of this note may be an electronic transcription/translation of spoken language to printed text using the Dragon Dictation System.

## 2024-04-10 ENCOUNTER — OFFICE VISIT (OUTPATIENT)
Dept: UROLOGY | Facility: CLINIC | Age: 64
End: 2024-04-10
Payer: MEDICARE

## 2024-04-10 VITALS
HEIGHT: 69 IN | WEIGHT: 243.6 LBS | DIASTOLIC BLOOD PRESSURE: 112 MMHG | BODY MASS INDEX: 36.08 KG/M2 | HEART RATE: 76 BPM | SYSTOLIC BLOOD PRESSURE: 196 MMHG

## 2024-04-10 DIAGNOSIS — N40.1 BENIGN PROSTATIC HYPERPLASIA WITH URINARY HESITANCY: ICD-10-CM

## 2024-04-10 DIAGNOSIS — N41.0 ACUTE PROSTATITIS: Primary | ICD-10-CM

## 2024-04-10 DIAGNOSIS — R39.11 BENIGN PROSTATIC HYPERPLASIA WITH URINARY HESITANCY: ICD-10-CM

## 2024-04-10 LAB
BILIRUB BLD-MCNC: NEGATIVE MG/DL
CLARITY, POC: CLEAR
COLOR UR: YELLOW
EXPIRATION DATE: NORMAL
GLUCOSE UR STRIP-MCNC: NEGATIVE MG/DL
KETONES UR QL: NEGATIVE
LEUKOCYTE EST, POC: NEGATIVE
Lab: NORMAL
NITRITE UR-MCNC: NEGATIVE MG/ML
PH UR: 6 [PH] (ref 5–8)
PROT UR STRIP-MCNC: NEGATIVE MG/DL
RBC # UR STRIP: NEGATIVE /UL
SP GR UR: 1.01 (ref 1–1.03)
UROBILINOGEN UR QL: NORMAL

## 2024-04-10 PROCEDURE — 84153 ASSAY OF PSA TOTAL: CPT

## 2024-04-10 RX ORDER — ATORVASTATIN CALCIUM 10 MG/1
1 TABLET, FILM COATED ORAL DAILY
COMMUNITY

## 2024-04-10 RX ORDER — ASPIRIN 81 MG/1
81 TABLET ORAL ONCE
COMMUNITY

## 2024-04-10 NOTE — PROGRESS NOTES
"Chief Complaint:    Chief Complaint   Patient presents with    gross hematuria    acute prostatitis      Follow up        Vital Signs:   BP (!) 196/112   Pulse 76   Ht 175.3 cm (69\")   Wt 110 kg (243 lb 9.6 oz)   BMI 35.97 kg/m²   Body mass index is 35.97 kg/m².      HPI:  Bahman Hernandez is a 64 y.o. male who presents today for follow up    History of Present Illness  Mr. Hernandez presents to the clinic for follow-up for acute prostatitis, hematospermia, and gross hematuria.  He was initially seen in office by me and underwent a course of Bactrim with overall improvement.  He was scheduled 6 months ago to undergo a cystoscopy by Dr. Meyer which showed no significant outlet obstruction.  There was notable inflammation and infection however no acute flareup was noted at time of cystoscopy.  Patient did have initially elevated PSA of 4.1 in October 2022.  He is unsure if he has had a PSA since then.  He does endorse brown-colored semen and dark brown-colored urine.  He denies any bright red urinary or semen output.  He denies any significant perineal or low back pain.  Urine analysis completed in office today is negative for gross/microscopic hematuria, leukocytes, nitrites, protein, or glucose.  Patient states that he is doing well.  He has a current IPSS score of 5.  He gets up roughly 1 time throughout the night and endorses some slight frequency, urgency, and intermittency.  He denies any sensations of incomplete emptying, weak stream, or straining to begin with urination.      Past Medical History:  History reviewed. No pertinent past medical history.    Current Meds:  Current Outpatient Medications   Medication Sig Dispense Refill    aspirin 81 MG EC tablet Take 1 tablet by mouth 1 (One) Time.      atorvastatin (LIPITOR) 10 MG tablet Take 1 tablet by mouth Daily.      metoprolol tartrate (LOPRESSOR) 50 MG tablet Take 1 tablet by mouth Daily. for blood pressure.      primidone (MYSOLINE) 50 MG tablet TAKE 1/2 " TABLET BY MOUTH ONCE DAILY IN THE EVENING AS DIRECTED BY YOUR PRESCRIBER       No current facility-administered medications for this visit.        Allergies:   Allergies   Allergen Reactions    Hydrocortisone Anaphylaxis    Lisinopril Other (See Comments)     Severe joint pain        Past Surgical History:  History reviewed. No pertinent surgical history.    Social History:  Social History     Socioeconomic History    Marital status: Single   Tobacco Use    Smoking status: Never    Smokeless tobacco: Never   Vaping Use    Vaping status: Never Used       Family History:  History reviewed. No pertinent family history.    Review of Systems:  Review of Systems   Constitutional:  Negative for chills, fatigue, fever and unexpected weight change.   HENT:  Negative for congestion and sinus pressure.    Respiratory:  Negative for chest tightness and shortness of breath.    Cardiovascular:  Negative for chest pain.   Gastrointestinal:  Positive for abdominal pain. Negative for constipation, diarrhea, nausea and vomiting.   Genitourinary:  Positive for hematuria. Negative for difficulty urinating, dysuria, flank pain, frequency, penile pain, penile swelling, scrotal swelling, testicular pain and urgency.   Musculoskeletal:  Negative for back pain and neck pain.   Skin:  Negative for rash.   Neurological:  Negative for dizziness and headaches.   Hematological:  Does not bruise/bleed easily.   Psychiatric/Behavioral:  Negative for confusion and suicidal ideas. The patient is nervous/anxious.        Physical Exam:  Physical Exam  Constitutional:       General: He is not in acute distress.     Appearance: Normal appearance.   HENT:      Head: Normocephalic and atraumatic.      Nose: Nose normal.      Mouth/Throat:      Mouth: Mucous membranes are moist.   Eyes:      Conjunctiva/sclera: Conjunctivae normal.   Cardiovascular:      Rate and Rhythm: Normal rate and regular rhythm.      Pulses: Normal pulses.      Heart sounds: Normal  heart sounds.   Pulmonary:      Effort: Pulmonary effort is normal.      Breath sounds: Normal breath sounds.   Abdominal:      General: Bowel sounds are normal.      Palpations: Abdomen is soft.   Musculoskeletal:         General: Normal range of motion.      Cervical back: Normal range of motion.   Skin:     General: Skin is warm.   Neurological:      General: No focal deficit present.      Mental Status: He is alert and oriented to person, place, and time.   Psychiatric:         Mood and Affect: Mood normal.         Behavior: Behavior normal.         Thought Content: Thought content normal.         Judgment: Judgment normal.         IPSS Questionnaire (AUA-7):  IPSS Questionnaire (AUA-7):                  IPSS Questionnaire (AUA-7):  Over the past month…    1)  Incomplete Emptying  How often have you had a sensation of not emptying your bladder?  0 - Not at all   2)  Frequency  How often have you had to urinate less than every two hours? 1 - Less than 1 time in 5   3)  Intermittency  How often have you found you stopped and started again several times when you urinated?  1 - Less than 1 time in 5   4) Urgency  How often have you found it difficult to postpone urination?  2 - Less than half the time   5) Weak Stream  How often have you had a weak urinary stream?  0 - Not at all   6) Straining  How often have you had to push or strain to begin urination?  0 - Not at all   7) Nocturia  How many times did you typically get up at night to urinate?  1 - 1 time   Total Score:  5   The International Prostate Symptom Score (IPSS) is used to screen, diagnose, track symptoms of benign prostatic hyperplasia (BPH).    0-7 pts (Mild Symptoms)  / 8-19 pts (Moderate) / 20-35 (Severe)    Quality of life due to urinary symptoms:  If you were to spend the rest of your life with your urinary condition the way it is now, how would you feel about that? 3-Mixed   Urine Leakage (Incontinence) 0-No Leakage       Recent Image (CT and/or  KUB):   CT Abdomen and Pelvis: No results found for this or any previous visit.     CT Stone Protocol: No results found for this or any previous visit.     KUB: No results found for this or any previous visit.       Labs:  Brief Urine Lab Results  (Last result in the past 365 days)        Color   Clarity   Blood   Leuk Est   Nitrite   Protein   CREAT   Urine HCG        04/10/24 1132 Yellow   Clear   Negative   Negative   Negative   Negative                 Office Visit on 04/10/2024   Component Date Value Ref Range Status    Color 04/10/2024 Yellow  Yellow, Straw, Dark Yellow, Jenny Final    Clarity, UA 04/10/2024 Clear  Clear Final    Specific Gravity  04/10/2024 1.015  1.005 - 1.030 Final    pH, Urine 04/10/2024 6.0  5.0 - 8.0 Final    Leukocytes 04/10/2024 Negative  Negative Final    Nitrite, UA 04/10/2024 Negative  Negative Final    Protein, POC 04/10/2024 Negative  Negative mg/dL Final    Glucose, UA 04/10/2024 Negative  Negative mg/dL Final    Ketones, UA 04/10/2024 Negative  Negative Final    Urobilinogen, UA 04/10/2024 Normal  Normal, 0.2 E.U./dL Final    Bilirubin 04/10/2024 Negative  Negative Final    Blood, UA 04/10/2024 Negative  Negative Final    Lot Number 04/10/2024 n   Final    Expiration Date 04/10/2024 n   Final    PSA 04/10/2024 3.880  0.000 - 4.000 ng/mL Final        Procedure: None  Procedures     I have reviewed and agree with the above PMH, PSH, FMH, social history, medications, allergies, and labs.     Assessment/Plan:   Problem List Items Addressed This Visit          Genitourinary and Reproductive     Acute prostatitis - Primary    Relevant Orders    POC Urinalysis Dipstick, Automated (Completed)     Other Visit Diagnoses       Benign prostatic hyperplasia with urinary hesitancy        Relevant Orders    PSA Diagnostic (Completed)            Health Maintenance:   Health Maintenance Due   Topic Date Due    BMI FOLLOWUP  Never done    COLORECTAL CANCER SCREENING  Never done    TDAP/TD  VACCINES (1 - Tdap) Never done    ZOSTER VACCINE (1 of 2) Never done    RSV Vaccine - Adults (1 - 1-dose 60+ series) Never done    HEPATITIS C SCREENING  Never done    ANNUAL WELLNESS VISIT  Never done    COVID-19 Vaccine (3 - 2023-24 season) 09/01/2023    Pneumococcal Vaccine 65+ (1 of 1 - PCV) Never done        Smoking Counseling: Never smoked use smokeless tobacco    Urine Incontinence: Patient reports that he is not currently experiencing any symptoms of urinary incontinence.    Patient was given instructions and counseling regarding his condition or for health maintenance advice. Please see specific information pulled into the AVS if appropriate.    Patient Education:   Benign Prostate Hypertrophy (BPH): Discussed the pathophysiology of BPH and obstruction.  We discussed the static and dynamic effects of BPH as well as using 5 alpha reductase inhibitors versus alpha blockade.  We discussed the indications for transurethral surgery as well and/ or other therapeutic options available including all of the newer techniques.  Given patient's minimal lower urinary tract symptoms we will hold off on starting any new medications.  Patient's cystoscopy in office was unremarkable.  PSA testing - I am recommending a prostate specific antigen blood test or PSA.  I discussed the pathophysiology of PSA testing indicating its use in the diagnosis and management of prostate cancer.  I discussed the normal range being 0 to 4, but more appropriately being much closer to 0 to 2 in a normal male.  I discussed the fact that after a certain age it is against recommendation to use PSA testing especially in view of numerous comorbidities.  I discussed many of the things that can artificially raise PSA including put not limited to a recent infection, urinary tract infection, recent sexual intercourse, or even the type of movement such as manipulation of the prostate from riding a bicycle.  It was discussed that the most important use  of PSA is the velocity measurement.  This refers to the change of PSA with time. I discussed that we look for greater than 20% rise over a year to help us make the prediction of prostate cancer.  I also discussed that in the case of prostate cancer indicating a radical prostatectomy, the PSA should be 0 and any rise indicates an early biochemical recurrence.  I will call patient with PSA results once available.  Prostatitis -discussed with the patient the pathophysiology of this condition in detail.  Discussed treatment options which can include conservative methods versus antibiotics.  Discussed the risk benefits of antibiotics such as sulfa versus fluoroquinolones.  Given patient is having minimal symptoms and a negative UA in office today I am recommending conservative treatment methods.  Vies him to continue with warm soaks and compresses as needed.  Advised him to use donut cushion type seats when traveling for long extended periods of time.  Otherwise we will see him back in 6 months or sooner if needed.    Visit Diagnoses:    ICD-10-CM ICD-9-CM   1. Acute prostatitis  N41.0 601.0   2. Benign prostatic hyperplasia with urinary hesitancy  N40.1 600.01    R39.11 788.64       Meds Ordered During Visit:  No orders of the defined types were placed in this encounter.      Follow Up Appointment: 6 months  No follow-ups on file.      This document has been electronically signed by Papo Garibay PA-C   April 11, 2024 10:22 EDT    Part of this note may be an electronic transcription/translation of spoken language to printed text using the Dragon Dictation System.

## 2024-04-11 ENCOUNTER — TELEPHONE (OUTPATIENT)
Dept: UROLOGY | Facility: CLINIC | Age: 64
End: 2024-04-11
Payer: MEDICARE

## 2024-04-11 LAB — PSA SERPL-MCNC: 3.88 NG/ML (ref 0–4)

## 2024-04-11 NOTE — TELEPHONE ENCOUNTER
Called patient to discuss PSA results.  Advised him PSA came back below 4 at 3.88.  Vies him this had decreased from 4.12 years ago.  Recommended repeat PSA in roughly 6 months to a year.  He verbalized understanding.

## 2024-05-16 ENCOUNTER — OFFICE VISIT (OUTPATIENT)
Dept: UROLOGY | Facility: CLINIC | Age: 64
End: 2024-05-16
Payer: MEDICARE

## 2024-05-16 VITALS
HEIGHT: 69 IN | HEART RATE: 64 BPM | SYSTOLIC BLOOD PRESSURE: 166 MMHG | DIASTOLIC BLOOD PRESSURE: 93 MMHG | BODY MASS INDEX: 34.8 KG/M2 | WEIGHT: 235 LBS

## 2024-05-16 DIAGNOSIS — R36.1 HEMATOSPERMIA: Primary | ICD-10-CM

## 2024-05-16 DIAGNOSIS — N41.0 ACUTE PROSTATITIS: ICD-10-CM

## 2024-05-16 DIAGNOSIS — R97.20 ELEVATED PROSTATE SPECIFIC ANTIGEN (PSA): ICD-10-CM

## 2024-05-16 DIAGNOSIS — N40.1 BENIGN PROSTATIC HYPERPLASIA WITH URINARY HESITANCY: ICD-10-CM

## 2024-05-16 DIAGNOSIS — R39.11 BENIGN PROSTATIC HYPERPLASIA WITH URINARY HESITANCY: ICD-10-CM

## 2024-05-16 RX ORDER — SULFAMETHOXAZOLE AND TRIMETHOPRIM 800; 160 MG/1; MG/1
1 TABLET ORAL EVERY 12 HOURS
Qty: 28 TABLET | Refills: 0 | Status: SHIPPED | OUTPATIENT
Start: 2024-05-16

## 2024-05-16 NOTE — PROGRESS NOTES
"Chief Complaint:    Chief Complaint   Patient presents with    Blood in semen     Follow up       Vital Signs:   /93 (BP Location: Right arm, Patient Position: Sitting, Cuff Size: Adult)   Pulse 64   Ht 175.3 cm (69.02\")   Wt 107 kg (235 lb)   BMI 34.69 kg/m²   Body mass index is 34.69 kg/m².      HPI:  Bahman Hernandez is a 64 y.o. male who presents today for follow up    History of Present Illness  Mr. Hernandez presents to the clinic for follow-up for hematospermia.  He was seen 1 month ago in office and had a PSA completed at that time that came back normal at 3.88.  He does have a past medical history significant for acute prostatitis, BPH with lower urinary tract symptoms, and elevated PSA.  He had a PSA taken in September 2022 that did come back high at 4.1.  He did have a previous cystoscopy completed in office by Dr. Meyer in May 2023 that was unremarkable other than prostatitis.  Last office visit patient was seen dark-brown output within his semen.  He states that since last office visit he did have 2 incidences of right red blood but is since resolved.  He continues to have some intermittent dark brown semen output.  He also reports some low back pain.  Prostate on physical exam today is unremarkable.  He does wish to proceed forward with an MRI.  Will get this scheduled appropriately.  I will start him on a short course of Bactrim for prostatitis.      Past Medical History:  History reviewed. No pertinent past medical history.    Current Meds:  Current Outpatient Medications   Medication Sig Dispense Refill    aspirin 81 MG EC tablet Take 1 tablet by mouth 1 (One) Time.      atorvastatin (LIPITOR) 10 MG tablet Take 1 tablet by mouth Daily.      metoprolol tartrate (LOPRESSOR) 50 MG tablet Take 1 tablet by mouth Daily. for blood pressure.      primidone (MYSOLINE) 50 MG tablet TAKE 1/2 TABLET BY MOUTH ONCE DAILY IN THE EVENING AS DIRECTED BY YOUR PRESCRIBER      sulfamethoxazole-trimethoprim (Bactrim " DS) 800-160 MG per tablet Take 1 tablet by mouth Every 12 (Twelve) Hours. 28 tablet 0     No current facility-administered medications for this visit.        Allergies:   Allergies   Allergen Reactions    Hydrocortisone Anaphylaxis    Lisinopril Other (See Comments)     Severe joint pain        Past Surgical History:  History reviewed. No pertinent surgical history.    Social History:  Social History     Socioeconomic History    Marital status: Single   Tobacco Use    Smoking status: Never    Smokeless tobacco: Never   Vaping Use    Vaping status: Never Used       Family History:  History reviewed. No pertinent family history.    Review of Systems:  Review of Systems   Constitutional:  Negative for chills, fatigue, fever and unexpected weight change.   HENT:  Negative for congestion and sinus pressure.    Respiratory:  Negative for chest tightness and shortness of breath.    Cardiovascular:  Negative for chest pain.   Gastrointestinal:  Positive for abdominal pain. Negative for constipation, diarrhea, nausea and vomiting.   Genitourinary:  Negative for difficulty urinating, dysuria, flank pain, frequency, hematuria, penile pain, penile swelling, scrotal swelling, testicular pain and urgency.   Musculoskeletal:  Negative for back pain and neck pain.   Skin:  Negative for rash.   Neurological:  Negative for dizziness and headaches.   Hematological:  Does not bruise/bleed easily.   Psychiatric/Behavioral:  Negative for confusion and suicidal ideas. The patient is nervous/anxious.        Physical Exam:  Physical Exam  Constitutional:       General: He is not in acute distress.     Appearance: Normal appearance.   HENT:      Head: Normocephalic.      Mouth/Throat:      Mouth: Mucous membranes are moist.      Pharynx: Oropharynx is clear.   Eyes:      Extraocular Movements: Extraocular movements intact.      Conjunctiva/sclera: Conjunctivae normal.   Cardiovascular:      Rate and Rhythm: Normal rate and regular rhythm.       Pulses: Normal pulses.      Heart sounds: Normal heart sounds.   Pulmonary:      Effort: Pulmonary effort is normal.      Breath sounds: Normal breath sounds.   Abdominal:      General: Abdomen is flat. Bowel sounds are normal.      Palpations: Abdomen is soft.   Genitourinary:     Penis: Normal.       Testes: Normal.      Prostate: Normal.      Rectum: Normal.   Musculoskeletal:      Cervical back: Normal range of motion.   Skin:     General: Skin is warm.   Neurological:      General: No focal deficit present.      Mental Status: He is alert and oriented to person, place, and time.   Psychiatric:         Mood and Affect: Mood normal.         Thought Content: Thought content normal.         Judgment: Judgment normal.           Recent Image (CT and/or KUB):   CT Abdomen and Pelvis: No results found for this or any previous visit.     CT Stone Protocol: No results found for this or any previous visit.     KUB: No results found for this or any previous visit.       Labs:  Brief Urine Lab Results  (Last result in the past 365 days)        Color   Clarity   Blood   Leuk Est   Nitrite   Protein   CREAT   Urine HCG        04/10/24 1132 Yellow   Clear   Negative   Negative   Negative   Negative                 Office Visit on 04/10/2024   Component Date Value Ref Range Status    Color 04/10/2024 Yellow  Yellow, Straw, Dark Yellow, Jenny Final    Clarity, UA 04/10/2024 Clear  Clear Final    Specific Gravity  04/10/2024 1.015  1.005 - 1.030 Final    pH, Urine 04/10/2024 6.0  5.0 - 8.0 Final    Leukocytes 04/10/2024 Negative  Negative Final    Nitrite, UA 04/10/2024 Negative  Negative Final    Protein, POC 04/10/2024 Negative  Negative mg/dL Final    Glucose, UA 04/10/2024 Negative  Negative mg/dL Final    Ketones, UA 04/10/2024 Negative  Negative Final    Urobilinogen, UA 04/10/2024 Normal  Normal, 0.2 E.U./dL Final    Bilirubin 04/10/2024 Negative  Negative Final    Blood, UA 04/10/2024 Negative  Negative Final    Lot  Number 04/10/2024 n   Final    Expiration Date 04/10/2024 n   Final    PSA 04/10/2024 3.880  0.000 - 4.000 ng/mL Final        Procedure: None  Procedures     I have reviewed and agree with the above PMH, PSH, FMH, social history, medications, allergies, and labs.     Assessment/Plan:   Problem List Items Addressed This Visit       Acute prostatitis    Relevant Medications    sulfamethoxazole-trimethoprim (Bactrim DS) 800-160 MG per tablet    Elevated prostate specific antigen (PSA)    Relevant Orders    MRI Prostate With & Without Contrast     Other Visit Diagnoses       Hematospermia    -  Primary    Relevant Orders    MRI Prostate With & Without Contrast    Benign prostatic hyperplasia with urinary hesitancy                Health Maintenance:   Health Maintenance Due   Topic Date Due    BMI FOLLOWUP  Never done    COLORECTAL CANCER SCREENING  Never done    TDAP/TD VACCINES (1 - Tdap) Never done    ZOSTER VACCINE (1 of 2) Never done    RSV Vaccine - Adults (1 - 1-dose 60+ series) Never done    HEPATITIS C SCREENING  Never done    ANNUAL WELLNESS VISIT  Never done    COVID-19 Vaccine (3 - 2023-24 season) 09/01/2023    Pneumococcal Vaccine 65+ (1 of 1 - PCV) Never done        Smoking Counseling: Never smoked.  Never used smokeless tobacco.     Urine Incontinence: Patient reports that he is not currently experiencing any symptoms of urinary incontinence.    Patient was given instructions and counseling regarding his condition or for health maintenance advice. Please see specific information pulled into the AVS if appropriate.    Patient Education:   Elevated PSA/enlarged prostate): Discussed the pathophysiology of BPH and obstruction.  We discussed the static and dynamic effects of BPH as well as using 5 alpha reductase inhibitors versus alpha blockade.  We discussed the indications for transurethral surgery as well and/ or other therapeutic options available including all of the newer techniques.  Given patient's  minimal lower urinary tract symptoms, I will start him on any medications.  We will schedule patient for MRI given history of elevated PSA and hematospermia.  Discussed the risk and benefits of an MRI with and without contrast.  Educated patient about PI-RADS 1 through 5 scoring.  Educated patient that I wanted to his low risk for clinically significant prostate carcinoma, 3 is intermittent risk, and 4 and 5 is high risk leading directly to a prostate biopsy.  I will call patient with MRI results once available.  Did advise patient if PI-RADS 4 or 5 is determined on MRI we will proceed directly with the biopsy in office.  Discussed the risk and benefits of this procedure as well.  Acute prostatitis/hematospermia -discussed with the patient the pathophysiology of prostatitis/hematospermia.  Patient's prostate exam in office today was unremarkable.  Given continuation of bright red blood as well as brown semen output will start him on a 2-week course of Bactrim.  Discussed the risk and benefits of this medication in detail with the patient.  Advised him to increase water intake 2 to 3 L/day.  Otherwise we will allow the patient to keep a follow-up appointment in October for repeat PSA.    Visit Diagnoses:    ICD-10-CM ICD-9-CM   1. Hematospermia  R36.1 608.82   2. Acute prostatitis  N41.0 601.0   3. Elevated prostate specific antigen (PSA)  R97.20 790.93   4. Benign prostatic hyperplasia with urinary hesitancy  N40.1 600.01    R39.11 788.64       Meds Ordered During Visit:  New Medications Ordered This Visit   Medications    sulfamethoxazole-trimethoprim (Bactrim DS) 800-160 MG per tablet     Sig: Take 1 tablet by mouth Every 12 (Twelve) Hours.     Dispense:  28 tablet     Refill:  0       Follow Up Appointment: 2 months  No follow-ups on file.      This document has been electronically signed by Papo Garibay PA-C   May 16, 2024 15:45 EDT    Part of this note may be an electronic transcription/translation of  spoken language to printed text using the Dragon Dictation System.

## 2024-06-03 ENCOUNTER — HOSPITAL ENCOUNTER (OUTPATIENT)
Dept: MRI IMAGING | Facility: HOSPITAL | Age: 64
Discharge: HOME | End: 2024-06-03
Payer: MEDICARE

## 2024-06-03 DIAGNOSIS — R36.1 HEMATOSPERMIA: ICD-10-CM

## 2024-06-03 DIAGNOSIS — R97.20 ELEVATED PROSTATE SPECIFIC ANTIGEN (PSA): ICD-10-CM

## 2024-06-03 LAB — CREAT BLDA-MCNC: 0.8 MG/DL (ref 0.6–1.3)

## 2024-06-03 PROCEDURE — A9577 INJ MULTIHANCE: HCPCS

## 2024-06-03 PROCEDURE — 72197 MRI PELVIS W/O & W/DYE: CPT | Performed by: RADIOLOGY

## 2024-06-03 PROCEDURE — 0 GADOBENATE DIMEGLUMINE 529 MG/ML SOLUTION

## 2024-06-03 PROCEDURE — 82565 ASSAY OF CREATININE: CPT

## 2024-06-03 PROCEDURE — 72197 MRI PELVIS W/O & W/DYE: CPT

## 2024-06-03 RX ADMIN — GADOBENATE DIMEGLUMINE 20 ML: 529 INJECTION, SOLUTION INTRAVENOUS at 19:31

## 2024-06-05 ENCOUNTER — TELEPHONE (OUTPATIENT)
Dept: UROLOGY | Facility: CLINIC | Age: 64
End: 2024-06-05
Payer: MEDICARE

## 2024-06-05 DIAGNOSIS — N41.0 ACUTE PROSTATITIS: ICD-10-CM

## 2024-06-05 RX ORDER — CIPROFLOXACIN 500 MG/1
500 TABLET, FILM COATED ORAL EVERY 12 HOURS
Qty: 20 TABLET | Refills: 0 | Status: SHIPPED | OUTPATIENT
Start: 2024-06-05

## 2024-06-05 NOTE — TELEPHONE ENCOUNTER
Called patient to discuss MRI results and advised him MRI showed a PI-RADS 2 with low risk for clinically significant prostate cancer.  Did recommend to continue with conservative treatment methods and he would like a refill of antibiotics.  States he is unable to take Bactrim due to side effects I will send in a short course of ciprofloxacin 500 mg twice daily for 10 days.  Discussed the risk and benefits of fluoroquinolones including tendinitis/tendon rupture.  He verbalized understanding and will allow him to keep his a follow-up in October.

## 2024-10-10 ENCOUNTER — HOSPITAL ENCOUNTER (OUTPATIENT)
Dept: CT IMAGING | Facility: HOSPITAL | Age: 64
Discharge: HOME | End: 2024-10-10
Payer: MEDICARE

## 2024-10-10 ENCOUNTER — OFFICE VISIT (OUTPATIENT)
Dept: UROLOGY | Facility: CLINIC | Age: 64
End: 2024-10-10
Payer: MEDICARE

## 2024-10-10 VITALS
SYSTOLIC BLOOD PRESSURE: 199 MMHG | DIASTOLIC BLOOD PRESSURE: 105 MMHG | WEIGHT: 231 LBS | BODY MASS INDEX: 34.21 KG/M2 | HEART RATE: 56 BPM | HEIGHT: 69 IN

## 2024-10-10 DIAGNOSIS — R31.0 GROSS HEMATURIA: ICD-10-CM

## 2024-10-10 DIAGNOSIS — R36.1 HEMATOSPERMIA: ICD-10-CM

## 2024-10-10 DIAGNOSIS — N41.0 ACUTE PROSTATITIS: Primary | ICD-10-CM

## 2024-10-10 DIAGNOSIS — R97.20 ELEVATED PROSTATE SPECIFIC ANTIGEN (PSA): ICD-10-CM

## 2024-10-10 LAB
BILIRUB BLD-MCNC: NEGATIVE MG/DL
CLARITY, POC: CLEAR
COLOR UR: YELLOW
EXPIRATION DATE: NORMAL
GLUCOSE UR STRIP-MCNC: NEGATIVE MG/DL
KETONES UR QL: NEGATIVE
LEUKOCYTE EST, POC: NEGATIVE
Lab: NORMAL
NITRITE UR-MCNC: NEGATIVE MG/ML
PH UR: 7 [PH] (ref 5–8)
PROT UR STRIP-MCNC: NEGATIVE MG/DL
RBC # UR STRIP: NEGATIVE /UL
SP GR UR: 1.01 (ref 1–1.03)
UROBILINOGEN UR QL: NORMAL

## 2024-10-10 PROCEDURE — 74176 CT ABD & PELVIS W/O CONTRAST: CPT

## 2024-10-10 PROCEDURE — 84153 ASSAY OF PSA TOTAL: CPT

## 2024-10-10 NOTE — PROGRESS NOTES
"Chief Complaint:    Chief Complaint   Patient presents with    Blood in [place]        Vital Signs:   BP (!) 199/105 (BP Location: Right arm, Patient Position: Sitting) Comment: Pt took BP medication this morning  Pulse 56   Ht 175.3 cm (69.02\")   Wt 105 kg (231 lb)   BMI 34.10 kg/m²   Body mass index is 34.1 kg/m².      HPI:  Bahman Hernandez is a 64 y.o. male who presents today for follow up    History of Present Illness  Mr. Hernandez presents to the clinic for follow-up for hematuria and hematospermia.  He has a past medical history significant for acute prostatitis and has been on Bactrim and ciprofloxacin in the past.  He has also had an elevated PSA and is underwent a previous MRI that showed a PI-RADS 2 lesion with clinically low significant prostate carcinoma.  He was last seen 6 months ago and had a PSA taken at that time that came back within normal range at 3.88.  He does endorse intermittent hematospermia as well as hematuria.  Urine analysis in office today did show trace amount of blood with no signs of leukocytes or nitrites.  He denies any fever, chills, dysuria, perineum pain, testicular pain, or back/flank pain.      Past Medical History:  History reviewed. No pertinent past medical history.    Current Meds:  Current Outpatient Medications   Medication Sig Dispense Refill    aspirin 81 MG EC tablet Take 1 tablet by mouth 1 (One) Time.      atorvastatin (LIPITOR) 10 MG tablet Take 1 tablet by mouth Daily.      ciprofloxacin (Cipro) 500 MG tablet Take 1 tablet by mouth Every 12 (Twelve) Hours. 20 tablet 0    metoprolol tartrate (LOPRESSOR) 50 MG tablet Take 1 tablet by mouth Daily. for blood pressure.      primidone (MYSOLINE) 50 MG tablet TAKE 1/2 TABLET BY MOUTH ONCE DAILY IN THE EVENING AS DIRECTED BY YOUR PRESCRIBER      sulfamethoxazole-trimethoprim (Bactrim DS) 800-160 MG per tablet Take 1 tablet by mouth Every 12 (Twelve) Hours. 28 tablet 0     No current facility-administered medications for this " visit.        Allergies:   Allergies   Allergen Reactions    Hydrocortisone Anaphylaxis    Lisinopril Other (See Comments)     Severe joint pain        Past Surgical History:  History reviewed. No pertinent surgical history.    Social History:  Social History     Socioeconomic History    Marital status: Single   Tobacco Use    Smoking status: Never    Smokeless tobacco: Never   Vaping Use    Vaping status: Never Used       Family History:  History reviewed. No pertinent family history.    Review of Systems:  Review of Systems   Constitutional:  Negative for chills, fatigue, fever and unexpected weight change.   Respiratory:  Negative for cough, chest tightness, shortness of breath and wheezing.    Cardiovascular:  Negative for chest pain and leg swelling.   Gastrointestinal:  Negative for abdominal pain, constipation, diarrhea, nausea and vomiting.   Genitourinary:  Positive for hematuria. Negative for difficulty urinating, dysuria, flank pain, frequency, penile swelling, scrotal swelling, testicular pain and urgency.   Musculoskeletal:  Negative for back pain and joint swelling.   Skin:  Negative for rash.   Neurological:  Negative for dizziness and headaches.   Psychiatric/Behavioral:  Negative for confusion and suicidal ideas.        Physical Exam:  Physical Exam  Constitutional:       General: He is not in acute distress.     Appearance: Normal appearance.   HENT:      Head: Normocephalic and atraumatic.      Nose: Nose normal.      Mouth/Throat:      Mouth: Mucous membranes are moist.   Eyes:      Conjunctiva/sclera: Conjunctivae normal.   Cardiovascular:      Rate and Rhythm: Normal rate and regular rhythm.      Pulses: Normal pulses.      Heart sounds: Normal heart sounds.   Pulmonary:      Effort: Pulmonary effort is normal.      Breath sounds: Normal breath sounds.   Abdominal:      General: Bowel sounds are normal.      Palpations: Abdomen is soft.   Musculoskeletal:         General: Normal range of  motion.      Cervical back: Normal range of motion.   Skin:     General: Skin is warm.   Neurological:      General: No focal deficit present.      Mental Status: He is alert and oriented to person, place, and time.   Psychiatric:         Mood and Affect: Mood normal.         Behavior: Behavior normal.         Thought Content: Thought content normal.         Judgment: Judgment normal.             Recent Image (CT and/or KUB):   CT Abdomen and Pelvis: No results found for this or any previous visit.     CT Stone Protocol: No results found for this or any previous visit.     KUB: No results found for this or any previous visit.       Labs:  Brief Urine Lab Results  (Last result in the past 365 days)        Color   Clarity   Blood   Leuk Est   Nitrite   Protein   CREAT   Urine HCG        10/10/24 1047 Yellow   Clear   Negative   Negative   Negative   Negative                 Office Visit on 10/10/2024   Component Date Value Ref Range Status    Color 10/10/2024 Yellow  Yellow, Straw, Dark Yellow, Jenny Final    Clarity, UA 10/10/2024 Clear  Clear Final    Specific Gravity  10/10/2024 1.015  1.005 - 1.030 Final    pH, Urine 10/10/2024 7.0  5.0 - 8.0 Final    Leukocytes 10/10/2024 Negative  Negative Final    Nitrite, UA 10/10/2024 Negative  Negative Final    Protein, POC 10/10/2024 Negative  Negative mg/dL Final    Glucose, UA 10/10/2024 Negative  Negative mg/dL Final    Ketones, UA 10/10/2024 Negative  Negative Final    Urobilinogen, UA 10/10/2024 Normal  Normal, 0.2 E.U./dL Final    Bilirubin 10/10/2024 Negative  Negative Final    Blood, UA 10/10/2024 Negative  Negative Final    Lot Number 10/10/2024 [id number]   Final    Expiration Date 10/10/2024 102,024   Final        Procedure: None  Procedures     I have reviewed and agree with the above PMH, PSH, FMH, social history, medications, allergies, and labs.     Assessment/Plan:   Problem List Items Addressed This Visit       Acute prostatitis - Primary    Relevant  Orders    POC Urinalysis Dipstick, Automated (Completed)    Elevated prostate specific antigen (PSA)    Relevant Orders    PSA Diagnostic     Other Visit Diagnoses       Hematospermia        Relevant Orders    PSA Diagnostic    Gross hematuria        Relevant Orders    CT Abdomen Pelvis Stone Protocol            Health Maintenance:   Health Maintenance Due   Topic Date Due    BMI FOLLOWUP  Never done    COLORECTAL CANCER SCREENING  Never done    TDAP/TD VACCINES (1 - Tdap) Never done    ZOSTER VACCINE (1 of 2) Never done    HEPATITIS C SCREENING  Never done    ANNUAL WELLNESS VISIT  Never done    Pneumococcal Vaccine 65+ (1 of 1 - PCV) Never done    INFLUENZA VACCINE  Never done    COVID-19 Vaccine (3 - 2023-24 season) 09/01/2024        Smoking Counseling: Never smoked.  Never used smokeless tobacco    Urine Incontinence: Patient reports that he is not currently experiencing any symptoms of urinary incontinence.    Patient was given instructions and counseling regarding his condition or for health maintenance advice. Please see specific information pulled into the AVS if appropriate.    Patient Education:   Acute prostatitis -patient is asymptomatic at this time with only some intermittent gross hematuria and hematospermia.  He is urine analysis did show trace blood with no signs of infection.  Did discuss an appropriate workup for gross hematuria including a CT scan abdomen pelvis with and without contrast.  Patient wishes to hold off on contrast at this time.  We will schedule him for CT scan of the abdomen pelvis for evaluation of hematospermia and hematuria.  Did discuss use of antibiotics as indicated.  Vies him to continue with warm soaks and compresses as needed.  Otherwise I will call him with CT results once available.  Elevated PSA -patient does have a past medical history significant for elevated PSA and had a recent PSA that was well within normal limits at 3.88 and MRI showing a PI-RADS 2 lesion only.   Given current symptomology will repeat a PSA free and total in office.  Discussed the pathophysiology of this testing in detail.  Advised him I will call him with results once available.  Otherwise we will place him back in roughly 6 months for reevaluation.    Visit Diagnoses:    ICD-10-CM ICD-9-CM   1. Acute prostatitis  N41.0 601.0   2. Hematospermia  R36.1 608.82   3. Elevated prostate specific antigen (PSA)  R97.20 790.93   4. Gross hematuria  R31.0 599.71     A total of 20 minutes were spent coordinating this patient’s care in clinic today; 12 minutes of which were face-to-face with the patient, reviewing medical history and counseling on the current treatment and followup plan.  All questions were answered to patient's satisfaction.    Meds Ordered During Visit:  No orders of the defined types were placed in this encounter.      Follow Up Appointment: 6 months  No follow-ups on file.      This document has been electronically signed by Papo Garibay PA-C   October 10, 2024 12:22 EDT    Part of this note may be an electronic transcription/translation of spoken language to printed text using the Dragon Dictation System.

## 2024-10-11 ENCOUNTER — TELEPHONE (OUTPATIENT)
Dept: UROLOGY | Facility: CLINIC | Age: 64
End: 2024-10-11
Payer: MEDICARE

## 2024-10-11 LAB — PSA SERPL-MCNC: 3.11 NG/ML (ref 0–4)

## 2024-10-11 NOTE — TELEPHONE ENCOUNTER
I called the pt going over his CT scan and his PSA. The pt verbalized understanding.                                 ----- Message from Papo Garibay sent at 10/11/2024 12:42 PM EDT -----  Please let Mr. Hernandez know that his CT scan came back great.  There was no concerns of any nephrolithiasis or kidney/bladder mass.  His prostate was enlarged but his PSA number came back lower at 3.11.  Given improvement in overall PSA and negative CT I would recommend to continue with observation.  He may keep his follow-up appointment in April or return sooner if needed.  Thanks  ----- Message -----  From: Interface, Rad Results [place] In  Sent: 10/10/2024  12:29 PM EDT  To: Papo Garibay PA-C

## 2024-10-24 ENCOUNTER — EXTERNAL PBMM DATA (OUTPATIENT)
Dept: PHARMACY | Facility: OTHER | Age: 64
End: 2024-10-24
Payer: MEDICARE

## 2025-04-10 ENCOUNTER — OFFICE VISIT (OUTPATIENT)
Dept: UROLOGY | Facility: CLINIC | Age: 65
End: 2025-04-10
Payer: MEDICARE

## 2025-04-10 VITALS
HEIGHT: 69 IN | DIASTOLIC BLOOD PRESSURE: 94 MMHG | WEIGHT: 238.8 LBS | BODY MASS INDEX: 35.37 KG/M2 | HEART RATE: 81 BPM | SYSTOLIC BLOOD PRESSURE: 161 MMHG

## 2025-04-10 DIAGNOSIS — N40.1 BENIGN PROSTATIC HYPERPLASIA WITH URINARY HESITANCY: ICD-10-CM

## 2025-04-10 DIAGNOSIS — R39.11 BENIGN PROSTATIC HYPERPLASIA WITH URINARY HESITANCY: ICD-10-CM

## 2025-04-10 DIAGNOSIS — R97.20 ELEVATED PROSTATE SPECIFIC ANTIGEN (PSA): Primary | ICD-10-CM

## 2025-04-10 PROCEDURE — 84153 ASSAY OF PSA TOTAL: CPT

## 2025-04-10 NOTE — PROGRESS NOTES
"Chief Complaint:    Chief Complaint   Patient presents with    Acute Prostatitis       Vital Signs:   /94 (BP Location: Left arm, Patient Position: Sitting, Cuff Size: Adult)   Pulse 81   Ht 175.3 cm (69.02\")   Wt 108 kg (238 lb 12.8 oz)   BMI 35.25 kg/m²   Body mass index is 35.25 kg/m².      HPI:  Bahman Hernandez is a 65 y.o. male who presents today for follow up    History of Present Illness  Mr. Hernandez returns to the clinic today for follow-up for acute prostatitis as well as elevated PSA.  He was initially seen in November 2022 for an elevated PSA of 4.1.  He was having prostatitis symptoms and started on Bactrim with improvement in overall care.  He had a repeat PSA on 4/10/2024 that came back good at 3.8.  Repeat PSA 6 months later in October 2024 decreased further to 3.1.  He has also had an MRI in June 2024 that showed a PI-RADS 2 lesion.  He returns today and states he is doing well.  He currently does not have any lower urinary tract symptoms including but not limited to frequency, urgency, dysuria, gross hematuria, hematospermia, or inability to urinate.      Past Medical History:  History reviewed. No pertinent past medical history.    Current Meds:  Current Outpatient Medications   Medication Sig Dispense Refill    aspirin 81 MG EC tablet Take 1 tablet by mouth 1 (One) Time.      atorvastatin (LIPITOR) 10 MG tablet Take 1 tablet by mouth Daily.      ciprofloxacin (Cipro) 500 MG tablet Take 1 tablet by mouth Every 12 (Twelve) Hours. 20 tablet 0    metoprolol tartrate (LOPRESSOR) 50 MG tablet Take 1 tablet by mouth Daily. for blood pressure.      primidone (MYSOLINE) 50 MG tablet TAKE 1/2 TABLET BY MOUTH ONCE DAILY IN THE EVENING AS DIRECTED BY YOUR PRESCRIBER      sulfamethoxazole-trimethoprim (Bactrim DS) 800-160 MG per tablet Take 1 tablet by mouth Every 12 (Twelve) Hours. 28 tablet 0     No current facility-administered medications for this visit.        Allergies:   Allergies   Allergen " Reactions    Hydrocortisone Anaphylaxis    Lisinopril Other (See Comments)     Severe joint pain        Past Surgical History:  History reviewed. No pertinent surgical history.    Social History:  Social History     Socioeconomic History    Marital status: Single   Tobacco Use    Smoking status: Never    Smokeless tobacco: Never   Vaping Use    Vaping status: Never Used   Substance and Sexual Activity    Alcohol use: Defer    Drug use: Defer    Sexual activity: Defer       Family History:  History reviewed. No pertinent family history.    Review of Systems:  Review of Systems   Constitutional:  Negative for chills, fatigue, fever and unexpected weight change.   Respiratory:  Negative for cough, chest tightness, shortness of breath and wheezing.    Cardiovascular:  Negative for chest pain and leg swelling.   Gastrointestinal:  Negative for abdominal pain, constipation, diarrhea, nausea and vomiting.   Genitourinary:  Negative for difficulty urinating, dysuria, flank pain, frequency, hematuria, penile swelling, scrotal swelling, testicular pain and urgency.   Musculoskeletal:  Negative for back pain and joint swelling.   Skin:  Negative for rash.   Neurological:  Negative for dizziness and headaches.   Psychiatric/Behavioral:  Negative for confusion and suicidal ideas.        Physical Exam:  Physical Exam  Constitutional:       General: He is not in acute distress.     Appearance: Normal appearance.   HENT:      Head: Normocephalic and atraumatic.      Nose: Nose normal.      Mouth/Throat:      Mouth: Mucous membranes are moist.   Eyes:      Conjunctiva/sclera: Conjunctivae normal.   Cardiovascular:      Rate and Rhythm: Normal rate.      Pulses: Normal pulses.   Pulmonary:      Effort: Pulmonary effort is normal.   Abdominal:      Palpations: Abdomen is soft.   Musculoskeletal:         General: Normal range of motion.      Cervical back: Normal range of motion.   Skin:     General: Skin is warm.   Neurological:       General: No focal deficit present.      Mental Status: He is alert and oriented to person, place, and time.   Psychiatric:         Mood and Affect: Mood normal.         Behavior: Behavior normal.         Thought Content: Thought content normal.         Judgment: Judgment normal.         Recent Image (CT and/or KUB):   CT Abdomen and Pelvis: No results found for this or any previous visit.     CT Stone Protocol: Results for orders placed during the hospital encounter of 10/10/24    CT Abdomen Pelvis Stone Protocol    Narrative  EXAM: CT ABDOMEN PELVIS STONE PROTOCOL-      TECHNIQUE: Multiple axial CT images were obtained from lung bases  through pubic symphysis WITHOUT administration of IV contrast.  Reformatted images in the coronal and/or sagittal plane(s) were  generated from the axial data set to facilitate diagnostic accuracy  and/or surgical planning.  Oral Contrast:NONE.    Radiation dose reduction techniques were utilized per ALARA protocol.  Automated exposure control was initiated through either or [employer] or  [employer] software packages by  protocol.  DOSE:    Clinical information hematuria/abdominal pain; R31.0-Gross hematuria    Comparison none immediately available at this time    FINDINGS:    Lower thorax: Clear. No effusions.    Abdomen:    Liver: Homogeneous but decreased in attenuation compatible with fatty  infiltration of the liver    Gallbladder: Surgically absent    Pancreas: Unremarkable. No mass or ductal dilatation.    Spleen: Homogeneous. No splenomegaly.    Adrenals: No mass.    Kidneys/ureters: No mass. No obstructive uropathy.  No evidence of  urolithiasis.    GI tract: Non-dilated. No definite wall thickening.. Sigmoid diverticuli  but no evidence of diverticulitis    MESENTERY: No free fluid, walled off fluid collections, mesenteric  stranding, or enlarged lymph nodes      Vasculature: Evidence of atherosclerotic vascular disease    Abdominal wall: Fat-containing umbilical  hernia    Bladder: Abnormal thickening of the urinary bladder wall    Reproductive: Prostate enlargement    Bones: No acute bony abnormality.    Impression  1. Abnormal thickening of the urinary bladder wall    2. Prostate enlargement    3. Fat-containing umbilical hernia.                  This report was finalized on 10/10/2024 12:27 PM by Dr. Candido Lowry MD.     KUB: No results found for this or any previous visit.        None  Procedures         Assessment/Plan:   Problem List Items Addressed This Visit       Elevated prostate specific antigen (PSA) - Primary    Relevant Orders    PSA Diagnostic (Completed)     Other Visit Diagnoses         Benign prostatic hyperplasia with urinary hesitancy        Relevant Orders    PSA Diagnostic (Completed)            Health Maintenance:   Health Maintenance Due   Topic Date Due    TDAP/TD VACCINES (1 - Tdap) Never done    COLORECTAL CANCER SCREENING  Never done    Pneumococcal Vaccine 50+ (1 of 1 - PCV) Never done    ZOSTER VACCINE (1 of 2) Never done    HEPATITIS C SCREENING  Never done    ANNUAL WELLNESS VISIT  Never done    COVID-19 Vaccine (3 - 2024-25 season) 09/01/2024        Smoking Counseling:Never smoked.  Never used smokeless tobacco.    Urine Incontinence: Patient reports that he is not currently experiencing any symptoms of urinary incontinence.    Patient was given instructions and counseling regarding his condition or for health maintenance advice. Please see specific information pulled into the AVS if appropriate.    Patient Education:   Acute prostatitis -symptoms are completely resolved.  Continue with observation.  Elevated PSA/BPH with hesitancy -patient's lower urinary tract symptoms are well-controlled at this time he is not currently taking any medications and I am recommending observation.  He has had an MRI that showed a PI-RADS 2 lesion only.  PSAs have been decreasing overall.  I am recommending a repeat PSA in office today and I will call patient  with results once available.  Did discuss the risks of elevated PSA in detail including concerns of prostatic carcinoma.  Discussed repeat blood work yearly for monitoring.  Did discuss the use of repeat MRI as well as an office biopsy for further workup of elevated PSA levels.  He verbalized understanding and we will place him back in 1 year or sooner if needed pending blood work    Visit Diagnoses:    ICD-10-CM ICD-9-CM   1. Elevated prostate specific antigen (PSA)  R97.20 790.93   2. Benign prostatic hyperplasia with urinary hesitancy  N40.1 600.01    R39.11 788.64     A total of 20 minutes were spent coordinating this patient’s care in clinic today; 12 minutes of which were face-to-face with the patient, reviewing medical history and counseling on the current treatment and followup plan.  All questions were answered to patient's satisfaction.    Meds Ordered During Visit:  No orders of the defined types were placed in this encounter.      Follow Up Appointment: 1 year pending blood  No follow-ups on file.      This document has been electronically signed by Papo Garibay PA-C   April 11, 2025 06:47 EDT    Part of this note may be an electronic transcription/translation of spoken language to printed text using the Dragon Dictation System.

## 2025-04-11 ENCOUNTER — RESULTS FOLLOW-UP (OUTPATIENT)
Dept: UROLOGY | Facility: CLINIC | Age: 65
End: 2025-04-11
Payer: MEDICARE

## 2025-04-11 LAB — PSA SERPL-MCNC: 4.96 NG/ML (ref 0–4)

## 2025-04-11 NOTE — TELEPHONE ENCOUNTER
Tempted to call the patient about his PSA however he did not answer.  Was able to get a hold of his daughter and advised to have him call back to discuss in further detail.  I am going to move his yearly follow-up to 6 months.  She verbalized understanding.

## 2025-06-30 ENCOUNTER — LAB (OUTPATIENT)
Dept: UROLOGY | Facility: CLINIC | Age: 65
End: 2025-06-30
Payer: MEDICARE

## 2025-06-30 DIAGNOSIS — R97.20 ELEVATED PROSTATE SPECIFIC ANTIGEN (PSA): Primary | ICD-10-CM

## 2025-06-30 LAB — PSA SERPL-MCNC: 4.39 NG/ML (ref 0–4)

## 2025-06-30 PROCEDURE — 84153 ASSAY OF PSA TOTAL: CPT

## 2025-07-01 ENCOUNTER — RESULTS FOLLOW-UP (OUTPATIENT)
Dept: UROLOGY | Facility: CLINIC | Age: 65
End: 2025-07-01
Payer: MEDICARE

## 2025-07-01 NOTE — TELEPHONE ENCOUNTER
Called patient and advised PSA decreasing recommend to keep follow-up.  He verbalized understanding